# Patient Record
Sex: FEMALE | Race: AMERICAN INDIAN OR ALASKA NATIVE | Employment: FULL TIME | URBAN - METROPOLITAN AREA
[De-identification: names, ages, dates, MRNs, and addresses within clinical notes are randomized per-mention and may not be internally consistent; named-entity substitution may affect disease eponyms.]

---

## 2020-06-25 ENCOUNTER — TRANSFERRED RECORDS (OUTPATIENT)
Dept: HEALTH INFORMATION MANAGEMENT | Facility: CLINIC | Age: 35
End: 2020-06-25

## 2020-07-31 DIAGNOSIS — H65.07 RECURRENT ACUTE SEROUS OTITIS MEDIA, UNSPECIFIED LATERALITY: Primary | ICD-10-CM

## 2020-08-18 ENCOUNTER — OFFICE VISIT (OUTPATIENT)
Dept: OTOLARYNGOLOGY | Facility: OTHER | Age: 35
End: 2020-08-18
Attending: NURSE PRACTITIONER
Payer: COMMERCIAL

## 2020-08-18 ENCOUNTER — OFFICE VISIT (OUTPATIENT)
Dept: AUDIOLOGY | Facility: OTHER | Age: 35
End: 2020-08-18
Attending: NURSE PRACTITIONER
Payer: COMMERCIAL

## 2020-08-18 VITALS
OXYGEN SATURATION: 98 % | DIASTOLIC BLOOD PRESSURE: 60 MMHG | HEART RATE: 93 BPM | WEIGHT: 175 LBS | TEMPERATURE: 98.6 F | SYSTOLIC BLOOD PRESSURE: 100 MMHG

## 2020-08-18 DIAGNOSIS — H93.13 TINNITUS, BILATERAL: ICD-10-CM

## 2020-08-18 DIAGNOSIS — H93.8X3 POPPING OF BOTH EARS: ICD-10-CM

## 2020-08-18 DIAGNOSIS — Z87.898 HISTORY OF VERTIGO: Primary | ICD-10-CM

## 2020-08-18 DIAGNOSIS — Z01.10 EXAMINATION OF EARS AND HEARING: Primary | ICD-10-CM

## 2020-08-18 DIAGNOSIS — H65.07 RECURRENT ACUTE SEROUS OTITIS MEDIA, UNSPECIFIED LATERALITY: ICD-10-CM

## 2020-08-18 DIAGNOSIS — L29.9 EAR ITCHING: ICD-10-CM

## 2020-08-18 PROCEDURE — 92504 EAR MICROSCOPY EXAMINATION: CPT | Performed by: NURSE PRACTITIONER

## 2020-08-18 PROCEDURE — 92550 TYMPANOMETRY & REFLEX THRESH: CPT | Performed by: AUDIOLOGIST

## 2020-08-18 PROCEDURE — 92557 COMPREHENSIVE HEARING TEST: CPT | Performed by: AUDIOLOGIST

## 2020-08-18 PROCEDURE — 99213 OFFICE O/P EST LOW 20 MIN: CPT | Mod: 25 | Performed by: NURSE PRACTITIONER

## 2020-08-18 RX ORDER — CETIRIZINE HYDROCHLORIDE 5 MG/1
5 TABLET ORAL DAILY
COMMUNITY

## 2020-08-18 ASSESSMENT — PAIN SCALES - GENERAL: PAINLEVEL: MILD PAIN (3)

## 2020-08-18 NOTE — PATIENT INSTRUCTIONS
Use Vinegar and Distilled Water irrigation    Use Zyrtec as needed    Follow up as needed    Thank you for allowing Maddy Mayo NP and our ENT team to participate in your care.  If your medications are too expensive, please give the nurse a call.  We can possibly change this medication.  If you have a scheduling or an appointment question please contact our Health Unit Coordinator at their direct line [phone].   ALL nursing questions or concerns can be directed to your ENT nurse at: [phone] Miguel Angel Pitts

## 2020-08-18 NOTE — PROGRESS NOTES
Audiology Evaluation Completed. Please refer SCANNED AUDIOGRAM and/or TYMPANOGRAM for BACKGROUND, RESULTS, RECOMMENDATIONS.      Farheen SAMPSON, [facility]  Audiologist #

## 2020-08-18 NOTE — LETTER
8/18/2020         RE: Lily Angulo  [address]  [place] [address]        Dear Colleague,    Thank you for referring your patient, Lily Angulo, to the [facility]. Please see a copy of my visit note below.      Otolaryngology Note         Chief Complaint:     Patient presents with:  Otitis Media: recurrent otitis media, referred from [facility]           History of Present Illness:     Lily Angulo is a 35 year old female seen today recurrent OM    She reports sound sensitivity, she notes bilateral ear clicking when she hears a loud sound like her dog barking or pans banging.  Symptoms have been present > 1 year.      She also notes moisture in her ear canals and draining sensation in her inner ear, no martínez otorrhea.    She uses q-tips in her ears daily.  She also notes the right ear was bleeding on the external ear a few months ago.  She noted drainage on her q-tip.     She notes flux hearing in both ears, she notes it happens about 1 time every 2 weeks and lasts a few minutes.  She has accompanied tinnitus.    She noted rotary vertigo about 3 weeks ago when she sat up in bed, each time it lasted seconds but the whole episode lasted several hours in the morning.  She denies vertigo symptoms at this time.   She also notes that she has trouble saying words if she cannot hear herself talk, has to take earphones out to hear herself speak  Last OM was about 3 months ago, she notes about 3 total OM in the past 2 years     Treatment has included zyrtec intermittently, she just started recently, very inconsistent use  Minimal tinnitus  No facial numbness or tingling.   No history of otological surgery  She reports history of frequent OM  No history of ear surgery or trauma to the ear   Minimal noise exposure.  + lawn mowing and weed whipping, no current HP, encouraged use  + family history of hearing loss - mother    No prior audiograms on file  No hearing aide use   No recent  inciting events    No history of nasal allergies or recurring sinus infections.      Audiogram 8/18/2020:   Tympanograms are Type A for both ears suggesting normal eardrum mobility.  Acoustic Reflex Thresholds at 1000 Hz are present for both ears.  Thresholds are normal sloping to slight likely sensorineural heairng loss 8000 Hz.  Speech reception thresholds are in good agreement with pure tone average.  Word discrimination scores are excellent at supra-thresholds level         Medications:     Current Outpatient Rx   Medication Sig Dispense Refill     cetirizine (ZYRTEC) 5 MG tablet Take 5 mg by mouth daily       hydrOXYzine (ATARAX) 25 MG tablet Take 25 mg by mouth 3 times daily as needed for itching       Prenatal Vit-Fe Fumarate-FA (PRENATAL MULTIVITAMIN  PLUS IRON) 27-1 MG TABS Take 1 tablet by mouth daily              Allergies:     Allergies: Patient has no known allergies.          Past Medical History:     History reviewed. No pertinent past medical history.         Past Surgical History:     History reviewed. No pertinent surgical history.         Social History:     Social History     Tobacco Use     Smoking status: Current Every Day Smoker     Packs/day: 0.50     Smokeless tobacco: Never Used   Substance Use Topics     Alcohol use: None     Drug use: None            Review of Systems:     ROS: See HPI         Physical Exam:     /60   Pulse 93   Temp 98.6  F (37  C) (Tympanic)   Wt 79.4 kg (175 lb)   SpO2 98%      General - The patient is well nourished and well developed, and appears to have good nutritional status.  Alert and oriented to person and place, answers questions and cooperates with examination appropriately.   Head and Face - Normocephalic and atraumatic, with no gross asymmetry noted.  The facial nerve is intact, with strong symmetric movements.  Voice and Breathing - The patient was breathing comfortably without the use of accessory muscles. There was no wheezing, stridor. The  patients voice was clear and strong, and had appropriate pitch and quality.  Cranial nerve examination: revealed that for cranial nerve   II: the pupils were reactive and the visual field were full  III, IV, and VI, the extraocular movements were full.    V: facial sensation intact bilateral   VII: facial movements are symmetric  VIII: hearing intact to voice  IX & X: the soft palate rises symmetrically   XI: shoulder movements are symmetric  XII: tongue is midline  [place] hallpike negative bilaterally  Ears - The ears were examined under binocular microscopy and with otoscope.  External ear normal. Canals are patent. Right tympanic membrane is intact without effusion, retraction or mass. Left tympanic membrane is intact without effusion, retraction or mass.  Eyes - Extraocular movements intact, sclera were not icteric or injected, conjunctiva were pink and moist.  Mouth - Examination of the oral cavity showed pink, healthy oral mucosa. Dentition in good condition. No lesions or ulcerations noted. The tongue was mobile and midline.   Throat - The walls of the oropharynx were smooth, pink, moist, symmetric, and had no lesions or ulcerations.  The tonsillar pillars and soft palate were symmetric. The uvula was midline on elevation.    Neck - Normal midline excursion of the laryngotracheal complex during swallowing.  Full range of motion on passive movement.  Palpation of the occipital, submental, submandibular, internal jugular chain, and supraclavicular nodes did not demonstrate any abnormal lymph nodes or masses.  Palpation of the thyroid was soft and smooth, with no nodules or goiter appreciated.  The trachea was mobile and midline.  Nose - External contour is symmetric, no gross deflection or scars.  Nasal mucosa is pink and moist with no abnormal mucus.  The septum and turbinates were evaluated: grossly normal.  No polyps, masses, or purulence noted on examination.         Assessment and Plan:       ICD-10-CM    1.  History of vertigo  Z87.898    2. Popping of both ears  H93.8X3    3. Tinnitus, bilateral  H93.13    4. Ear itching  L29.9      Start Zyrtec daily  Use 1/2 white vinegar, 1/2 distilled water rinses in both ears for ear itching  Stop q-tip use  She was assured that her ears appear healthy  She was assured that hearing is WNL with the exception of very mild loss on right at 8k hz  Follow up as needed    Maddy COWARTC  [facility]NT      Again, thank you for allowing me to participate in the care of your patient.        Sincerely,        Maddy Mayo NP

## 2020-08-18 NOTE — PROGRESS NOTES
Otolaryngology Note         Chief Complaint:     Patient presents with:  Otitis Media: recurrent otitis media, referred from [facility]           History of Present Illness:     Lily Angulo is a 35 year old female seen today recurrent OM    She reports sound sensitivity, she notes bilateral ear clicking when she hears a loud sound like her dog barking or pans banging.  Symptoms have been present > 1 year.      She also notes moisture in her ear canals and draining sensation in her inner ear, no martínez otorrhea.    She uses q-tips in her ears daily.  She also notes the right ear was bleeding on the external ear a few months ago.  She noted drainage on her q-tip.     She notes flux hearing in both ears, she notes it happens about 1 time every 2 weeks and lasts a few minutes.  She has accompanied tinnitus.    She noted rotary vertigo about 3 weeks ago when she sat up in bed, each time it lasted seconds but the whole episode lasted several hours in the morning.  She denies vertigo symptoms at this time.   She also notes that she has trouble saying words if she cannot hear herself talk, has to take earphones out to hear herself speak  Last OM was about 3 months ago, she notes about 3 total OM in the past 2 years     Treatment has included zyrtec intermittently, she just started recently, very inconsistent use  Minimal tinnitus  No facial numbness or tingling.   No history of otological surgery  She reports history of frequent OM  No history of ear surgery or trauma to the ear   Minimal noise exposure.  + lawn mowing and weed whipping, no current HP, encouraged use  + family history of hearing loss - mother    No prior audiograms on file  No hearing aide use   No recent inciting events    No history of nasal allergies or recurring sinus infections.      Audiogram 8/18/2020:   Tympanograms are Type A for both ears suggesting normal eardrum mobility.  Acoustic Reflex Thresholds at 1000 Hz are present for both  ears.  Thresholds are normal sloping to slight likely sensorineural heairng loss 8000 Hz.  Speech reception thresholds are in good agreement with pure tone average.  Word discrimination scores are excellent at supra-thresholds level         Medications:     Current Outpatient Rx   Medication Sig Dispense Refill     cetirizine (ZYRTEC) 5 MG tablet Take 5 mg by mouth daily       hydrOXYzine (ATARAX) 25 MG tablet Take 25 mg by mouth 3 times daily as needed for itching       Prenatal Vit-Fe Fumarate-FA (PRENATAL MULTIVITAMIN  PLUS IRON) 27-1 MG TABS Take 1 tablet by mouth daily              Allergies:     Allergies: Patient has no known allergies.          Past Medical History:     History reviewed. No pertinent past medical history.         Past Surgical History:     History reviewed. No pertinent surgical history.         Social History:     Social History     Tobacco Use     Smoking status: Current Every Day Smoker     Packs/day: 0.50     Smokeless tobacco: Never Used   Substance Use Topics     Alcohol use: None     Drug use: None            Review of Systems:     ROS: See HPI         Physical Exam:     /60   Pulse 93   Temp 98.6  F (37  C) (Tympanic)   Wt 79.4 kg (175 lb)   SpO2 98%      General - The patient is well nourished and well developed, and appears to have good nutritional status.  Alert and oriented to person and place, answers questions and cooperates with examination appropriately.   Head and Face - Normocephalic and atraumatic, with no gross asymmetry noted.  The facial nerve is intact, with strong symmetric movements.  Voice and Breathing - The patient was breathing comfortably without the use of accessory muscles. There was no wheezing, stridor. The patients voice was clear and strong, and had appropriate pitch and quality.  Cranial nerve examination: revealed that for cranial nerve   II: the pupils were reactive and the visual field were full  III, IV, and VI, the extraocular movements  were full.    V: facial sensation intact bilateral   VII: facial movements are symmetric  VIII: hearing intact to voice  IX & X: the soft palate rises symmetrically   XI: shoulder movements are symmetric  XII: tongue is midline  [place] hallpike negative bilaterally  Ears - The ears were examined under binocular microscopy and with otoscope.  External ear normal. Canals are patent. Right tympanic membrane is intact without effusion, retraction or mass. Left tympanic membrane is intact without effusion, retraction or mass.  Eyes - Extraocular movements intact, sclera were not icteric or injected, conjunctiva were pink and moist.  Mouth - Examination of the oral cavity showed pink, healthy oral mucosa. Dentition in good condition. No lesions or ulcerations noted. The tongue was mobile and midline.   Throat - The walls of the oropharynx were smooth, pink, moist, symmetric, and had no lesions or ulcerations.  The tonsillar pillars and soft palate were symmetric. The uvula was midline on elevation.    Neck - Normal midline excursion of the laryngotracheal complex during swallowing.  Full range of motion on passive movement.  Palpation of the occipital, submental, submandibular, internal jugular chain, and supraclavicular nodes did not demonstrate any abnormal lymph nodes or masses.  Palpation of the thyroid was soft and smooth, with no nodules or goiter appreciated.  The trachea was mobile and midline.  Nose - External contour is symmetric, no gross deflection or scars.  Nasal mucosa is pink and moist with no abnormal mucus.  The septum and turbinates were evaluated: grossly normal.  No polyps, masses, or purulence noted on examination.         Assessment and Plan:       ICD-10-CM    1. History of vertigo  Z87.898    2. Popping of both ears  H93.8X3    3. Tinnitus, bilateral  H93.13    4. Ear itching  L29.9      Start Zyrtec daily  Use 1/2 white vinegar, 1/2 distilled water rinses in both ears for ear itching  Stop q-tip  use  She was assured that her ears appear healthy  She was assured that hearing is WNL with the exception of very mild loss on right at 8k hz  Follow up as needed    Maddy COWARTC  [facility]

## 2020-08-18 NOTE — NURSING NOTE
Chief Complaint   Patient presents with     Otitis Media     recurrent otitis media, referred from [facility]       Initial /60   Pulse 93   Temp 98.6  F (37  C) (Tympanic)   Wt 79.4 kg (175 lb)   SpO2 98%  There is no height or weight on file to calculate BMI.  Medication Reconciliation: complete  Elena Aldrich LPN

## 2020-08-19 ENCOUNTER — OFFICE VISIT (OUTPATIENT)
Dept: PODIATRY | Facility: OTHER | Age: 35
End: 2020-08-19
Attending: PODIATRIST
Payer: COMMERCIAL

## 2020-08-19 ENCOUNTER — TELEPHONE (OUTPATIENT)
Dept: PODIATRY | Facility: OTHER | Age: 35
End: 2020-08-19

## 2020-08-19 VITALS
SYSTOLIC BLOOD PRESSURE: 105 MMHG | WEIGHT: 179.5 LBS | HEART RATE: 87 BPM | OXYGEN SATURATION: 96 % | TEMPERATURE: 99.1 F | DIASTOLIC BLOOD PRESSURE: 70 MMHG

## 2020-08-19 DIAGNOSIS — Z71.6 TOBACCO ABUSE COUNSELING: ICD-10-CM

## 2020-08-19 DIAGNOSIS — F17.210 CIGARETTE NICOTINE DEPENDENCE WITHOUT COMPLICATION: ICD-10-CM

## 2020-08-19 DIAGNOSIS — L60.3 ONYCHODYSTROPHY: Primary | ICD-10-CM

## 2020-08-19 DIAGNOSIS — F17.200 NICOTINE DEPENDENCE, UNCOMPLICATED, UNSPECIFIED NICOTINE PRODUCT TYPE: ICD-10-CM

## 2020-08-19 DIAGNOSIS — Z72.0 TOBACCO ABUSE: ICD-10-CM

## 2020-08-19 PROCEDURE — 99202 OFFICE O/P NEW SF 15 MIN: CPT | Mod: 25 | Performed by: PODIATRIST

## 2020-08-19 PROCEDURE — 11730 AVULSION NAIL PLATE SIMPLE 1: CPT | Mod: T1 | Performed by: PODIATRIST

## 2020-08-19 PROCEDURE — 87101 SKIN FUNGI CULTURE: CPT | Performed by: PODIATRIST

## 2020-08-19 RX ORDER — CLOTRIMAZOLE 1 %
CREAM (GRAM) TOPICAL 2 TIMES DAILY
Qty: 113 G | Refills: 3 | Status: SHIPPED | OUTPATIENT
Start: 2020-08-19

## 2020-08-19 RX ORDER — CLOTRIMAZOLE 1 G/ML
SOLUTION TOPICAL 2 TIMES DAILY
Qty: 60 ML | Refills: 3 | Status: SHIPPED | OUTPATIENT
Start: 2020-08-19

## 2020-08-19 ASSESSMENT — PAIN SCALES - GENERAL: PAINLEVEL: NO PAIN (0)

## 2020-08-19 NOTE — LETTER
August 19, 2020      Lily Angulo  [address]  [place] [address]        To Whom It May Concern:    Lily Angulo  was seen on 08/19/2020 for a procedure of her toe.  Please excuse her from work while she was at this appointment.       Sincerely,        Marielos Ibanez DPM

## 2020-08-19 NOTE — PROGRESS NOTES
Chief complaint: Patient presents with:  dystrophic nail      History of Present Illness: This 35 year old female is seen at the request of No ref. provider found for evaluation and suggestions of management of a thickened LEFT hallux toenail. About 2 1/2 years ago,s he stubbed her toe on a piece of wood. The nail stopped growing quickly and became thicker. She stubbed it again a few weeks ago at [facility] and it bled but did not fall off. She went to the [place] [facility], but they left the nail intact and told her she had fungus in the toenail. She overall does not have pain from the toenail, but she does have the nail catch on her socks a lot. She does not think the toenail is fully atttached to the nail bed and she doesn't think a new nail grew beneath the old nail.    No further pedal complaints today.     She smokes 1/2 ppd -- she does not want a quit plan referral today.        /70 (BP Location: Left arm, Patient Position: Sitting, Cuff Size: Adult Large)   Pulse 87   Temp 99.1  F (37.3  C) (Tympanic)   Wt 81.4 kg (179 lb 8 oz)   SpO2 96%     There is no problem list on file for this patient.      History reviewed. No pertinent surgical history.    Current Outpatient Medications   Medication     cetirizine (ZYRTEC) 5 MG tablet     hydrOXYzine (ATARAX) 25 MG tablet     Prenatal Vit-Fe Fumarate-FA (PRENATAL MULTIVITAMIN  PLUS IRON) 27-1 MG TABS     No current facility-administered medications for this visit.         No Known Allergies    History reviewed. No pertinent family history.    Social History     Socioeconomic History     Marital status: Single     Spouse name: None     Number of children: None     Years of education: None     Highest education level: None   Occupational History     None   Social Needs     Financial resource strain: None     Food insecurity     Worry: None     Inability: None     Transportation needs     Medical: None     Non-medical: None   Tobacco Use     Smoking status: Current Every  Day Smoker     Packs/day: 0.50     Smokeless tobacco: Never Used     Tobacco comment: Working on it slowly    Substance and Sexual Activity     Alcohol use: None     Drug use: None     Sexual activity: None   Lifestyle     Physical activity     Days per week: None     Minutes per session: None     Stress: None   Relationships     Social connections     Talks on phone: None     Gets together: None     Attends [employer] service: None     Active member of club or organization: None     Attends meetings of clubs or organizations: None     Relationship status: None     Intimate partner violence     Fear of current or ex partner: None     Emotionally abused: None     Physically abused: None     Forced sexual activity: None   Other Topics Concern     Parent/sibling w/ CABG, MI or angioplasty before 65F 55M? Not Asked   Social History Narrative     None       ROS: 10 point ROS neg other than the symptoms noted above in the HPI.  EXAM  Constitutional: healthy, alert and no distress    Psychiatric: mentation appears normal and affect normal/bright    VASCULAR:  -Dorsalis pedis pulse +2/4 b/l  -Posterior tibial pulse +2/4 b/l  -Capillary refill time < 3 seconds to b/l hallux  NEURO:  -Light touch sensation intact to b/l plantar forefoot  DERM:  -Skin temperature, texture and turgor WNL b/l  -Toenails elongated, thickened, dystrophic and discolored x 1 to LEFT hallux toenail  ---80% of central nail bed is loose with firm adherence to the medial, lateral and proximal nail borders  MSK:  -No pain on palpation to LEFT hallux nail bed  -Muscle strength of ankles +5/5 for dorsiflexion, plantarflexion, ABDUction and ADDuction b/l  ============================================================    ASSESSMENT:  (L60.3) Onychodystrophy  (primary encounter diagnosis)    (Z72.0) Tobacco abuse    (Z71.6) Tobacco abuse counseling    (F17.200) Nicotine dependence, uncomplicated, unspecified nicotine product type    (F17.210) Cigarette nicotine  dependence without complication      PLAN:  -Patient evaluated and examined. Treatment options discussed with no educational barriers noted.  -Discussed etiologies and treatment options for onychodystrophy. There may be fungi in the toenail, but it is not known until a nail culture is performed. Treatment options consist of leaving the toenail as is, treating the nail for fungi (culture would be taken today to confirm nail fungi), a nail avulsion and treating the fungi as the nail grows back in, or removing the toenail permanently. After reviewing risks and benefits, patient has decided to proceed with a nail avulsion and treat the nail fungi. The patient understands that with this treatment option, the new nail may grow back in the same way, it may come back worse with more dystrophy, or it may improve. She would like to try this before permanently removing the toenail. She has had this thickened toenail for 2 1/2 years so she does want the nail removed.    -Toenail avulsion of left hallux toenail: Written and verbal consent obtained after reviewing risks and benefits of the procedure. Patient understands that although phenol is used in attempt to prevent regrowth of the ingrown toenail, the nail can still grow back. There is also a risk of post procedure infection. A severe foot infection could lead to a proximal foot or leg amputation or loss of life, so the patient is advised to return to podiatry or the ED immediately if the patient notices any SOI. The patient is in agreement with this plan and wishes to proceed with the procedure. A time-out was performed to identify the correct patient, limb, digit and procedure.    An alcohol prep pad was applied to  to the base of the left hallux. The digit was injected with 7 mL of 1:1 of 2% Lidocaine plain and 0.5% marcaine plain. Adequate local anesthesia was obtained. A ring tournicot was applied to the digit and a chloroprep was applied to the hallux. A freer was used  "to loosen the nail from the underlying nail bed. An English Anvil and a hemostat were then used to remove the nail in total. The digit was rinsed thoroughly with alcohol. The tournicot was removed from the toe and there was a prompt hyperemic response to the hallux. The wound was then dressed with an Silvadene, gauze and 1\" coban. The patient was educated on after procedure care including daily epsom salt soaks starting tomorrow followed by dressing of the toe with an antibiotic cream and a bandaid until the wound site on the toe stops draining (2-3 weeks). Provided education on how to look for signs of infection (redness, swelling, pain, purulence, fever, chills, nausea, vomiting) and the patient was instructed to return to the clinic or Emergency Department immediately if there are any signs of infection.  ---Toenail culture sent on 08/19/2020    -Educated patient on toenail fungi and anti-fungal treatments. Toenail fungi is very difficult to treat and has a very high recurrence rate. Most effective treatment is often from oral Lamisil, but this can sometimes cause fluctuations in liver values. Topicals may improve the nail appearance, but they must be used consistently on daily basis for 6-12 months before noticing a difference and they will unlikely entirely \"cure\" the toenail fungi. Recurrence rate is also very high. Patient relates to understanding these instructions and wishes to proceed with anti-fungal cream as the new nail grows back on the LEFT great toe. She is instructed to use the cream on all digits while treating the hallux.   ---Clotrimazole cream ordered for daily use once toenail procedure site is fully healed    -Tobacco cessation discussed with patient including the benefits of quitting and the risks of not quitting. The Quit Plan referral was offered and patient stated she does not want the quit plan referral. Patient is not interested in quitting today.    -Patient in agreement with the above " treatment plan and all of patient's questions were answered.      RTC as needed        Marielos Ibanez DPM

## 2020-08-19 NOTE — NURSING NOTE
Chief Complaint   Patient presents with     dystrophic nail       Initial /70 (BP Location: Left arm, Patient Position: Sitting, Cuff Size: Adult Large)   Pulse 87   Temp 99.1  F (37.3  C) (Tympanic)   Wt 81.4 kg (179 lb 8 oz)   SpO2 96%  There is no height or weight on file to calculate BMI.  Medication Reconciliation: complete    Beata [place] LPN

## 2020-08-19 NOTE — PATIENT INSTRUCTIONS
Nail procedure care:  -Start epsom salt soaks tomorrow. Soak the foot 1-2 times a day for 20 minutes.  -Apply an antibiotic cream, gauze and a bandaid over the toe for the first week after the procedure.  -After one week, switch to a betadine dressing. Apply a small amount of betadine on gauze or dab the betadine over the toe with gauze and apply another dry gauze over the toe followed by a band aid or tape.  -Do not apply a band aid directly over the nail procedure site without gauze.     Keep the toe covered at all times until it is completely healed.  -You may develop a black scab over the nail bed--let this fall off on its own and don't pick at it.  -The toe may drain for 2-3 weeks. It is normal for it to have a clear drainage.    Watching for signs of infection:  If the toe has a thick, white pus coming from the procedure site or if the the toe becomes red, swollen, painful, or you begin to feel sick (fever/chills/nausea/vomitting), return to the podiatry clinic immediately or to the emergency room if after hours.      Thank you for allowing  and our Podiatry team to participate in your care. Please call our office at [phone] with scheduling questions or with any other questions or concerns.

## 2020-08-19 NOTE — TELEPHONE ENCOUNTER
11:25 AM    Reason for Call: Phone Call- Medication Alternative     Description: clotrimazole 1% sent to [facility] on 8/19/20 is no longer available.   Pharmacy requesting an alternative.     Was an appointment offered for this call? No  If yes : Appointment type              Date    Preferred method for responding to this message: E Prescribe   What is your phone number ?    If we cannot reach you directly, may we leave a detailed response at the number you provided? NA    Can this message wait until your PCP/provider returns, if available today?   No    Stephanie Vang RN

## 2020-09-16 LAB
BACTERIA SPEC CULT: NORMAL
SPECIMEN SOURCE: NORMAL